# Patient Record
Sex: MALE | Race: BLACK OR AFRICAN AMERICAN | NOT HISPANIC OR LATINO | URBAN - METROPOLITAN AREA
[De-identification: names, ages, dates, MRNs, and addresses within clinical notes are randomized per-mention and may not be internally consistent; named-entity substitution may affect disease eponyms.]

---

## 2020-02-20 ENCOUNTER — EMERGENCY (EMERGENCY)
Facility: HOSPITAL | Age: 25
LOS: 1 days | Discharge: HOME | End: 2020-02-20
Attending: EMERGENCY MEDICINE | Admitting: EMERGENCY MEDICINE
Payer: MEDICAID

## 2020-02-20 VITALS
DIASTOLIC BLOOD PRESSURE: 69 MMHG | TEMPERATURE: 99 F | SYSTOLIC BLOOD PRESSURE: 105 MMHG | HEIGHT: 70 IN | WEIGHT: 136.91 LBS | OXYGEN SATURATION: 100 % | RESPIRATION RATE: 18 BRPM | HEART RATE: 89 BPM

## 2020-02-20 VITALS
SYSTOLIC BLOOD PRESSURE: 107 MMHG | TEMPERATURE: 98 F | RESPIRATION RATE: 18 BRPM | OXYGEN SATURATION: 99 % | HEART RATE: 71 BPM | DIASTOLIC BLOOD PRESSURE: 70 MMHG

## 2020-02-20 LAB
ALBUMIN SERPL ELPH-MCNC: 5.2 G/DL — HIGH (ref 3.3–5)
ALP SERPL-CCNC: 51 U/L — SIGNIFICANT CHANGE UP (ref 40–120)
ALT FLD-CCNC: 15 U/L — SIGNIFICANT CHANGE UP (ref 10–45)
ANION GAP SERPL CALC-SCNC: 16 MMOL/L — SIGNIFICANT CHANGE UP (ref 5–17)
APPEARANCE UR: CLEAR — SIGNIFICANT CHANGE UP
APTT BLD: 29.7 SEC — SIGNIFICANT CHANGE UP (ref 27.5–36.3)
AST SERPL-CCNC: 29 U/L — SIGNIFICANT CHANGE UP (ref 10–40)
BASOPHILS # BLD AUTO: 0 K/UL — SIGNIFICANT CHANGE UP (ref 0–0.2)
BASOPHILS NFR BLD AUTO: 0 % — SIGNIFICANT CHANGE UP (ref 0–2)
BILIRUB SERPL-MCNC: 0.4 MG/DL — SIGNIFICANT CHANGE UP (ref 0.2–1.2)
BILIRUB UR-MCNC: NEGATIVE — SIGNIFICANT CHANGE UP
BUN SERPL-MCNC: 12 MG/DL — SIGNIFICANT CHANGE UP (ref 7–23)
CALCIUM SERPL-MCNC: 9.8 MG/DL — SIGNIFICANT CHANGE UP (ref 8.4–10.5)
CHLORIDE SERPL-SCNC: 102 MMOL/L — SIGNIFICANT CHANGE UP (ref 96–108)
CO2 SERPL-SCNC: 24 MMOL/L — SIGNIFICANT CHANGE UP (ref 22–31)
COLOR SPEC: YELLOW — SIGNIFICANT CHANGE UP
CREAT SERPL-MCNC: 1.18 MG/DL — SIGNIFICANT CHANGE UP (ref 0.5–1.3)
DIFF PNL FLD: NEGATIVE — SIGNIFICANT CHANGE UP
EOSINOPHIL # BLD AUTO: 0 K/UL — SIGNIFICANT CHANGE UP (ref 0–0.5)
EOSINOPHIL NFR BLD AUTO: 0 % — SIGNIFICANT CHANGE UP (ref 0–6)
FLU A RESULT: SIGNIFICANT CHANGE UP
FLU A RESULT: SIGNIFICANT CHANGE UP
FLUAV AG NPH QL: SIGNIFICANT CHANGE UP
FLUBV AG NPH QL: DETECTED
GLUCOSE SERPL-MCNC: 109 MG/DL — HIGH (ref 70–99)
GLUCOSE UR QL: NEGATIVE — SIGNIFICANT CHANGE UP
HCT VFR BLD CALC: 43.8 % — SIGNIFICANT CHANGE UP (ref 39–50)
HGB BLD-MCNC: 14.6 G/DL — SIGNIFICANT CHANGE UP (ref 13–17)
INR BLD: 1.36 — HIGH (ref 0.88–1.16)
KETONES UR-MCNC: 15 MG/DL
LEUKOCYTE ESTERASE UR-ACNC: NEGATIVE — SIGNIFICANT CHANGE UP
LIDOCAIN IGE QN: 27 U/L — SIGNIFICANT CHANGE UP (ref 7–60)
LYMPHOCYTES # BLD AUTO: 0.21 K/UL — LOW (ref 1–3.3)
LYMPHOCYTES # BLD AUTO: 2.6 % — LOW (ref 13–44)
MCHC RBC-ENTMCNC: 29.7 PG — SIGNIFICANT CHANGE UP (ref 27–34)
MCHC RBC-ENTMCNC: 33.3 GM/DL — SIGNIFICANT CHANGE UP (ref 32–36)
MCV RBC AUTO: 89 FL — SIGNIFICANT CHANGE UP (ref 80–100)
MONOCYTES # BLD AUTO: 0.35 K/UL — SIGNIFICANT CHANGE UP (ref 0–0.9)
MONOCYTES NFR BLD AUTO: 4.3 % — SIGNIFICANT CHANGE UP (ref 2–14)
NEUTROPHILS # BLD AUTO: 7.25 K/UL — SIGNIFICANT CHANGE UP (ref 1.8–7.4)
NEUTROPHILS NFR BLD AUTO: 87 % — HIGH (ref 43–77)
NITRITE UR-MCNC: NEGATIVE — SIGNIFICANT CHANGE UP
PH UR: 7.5 — SIGNIFICANT CHANGE UP (ref 5–8)
PLATELET # BLD AUTO: 207 K/UL — SIGNIFICANT CHANGE UP (ref 150–400)
POTASSIUM SERPL-MCNC: 3.8 MMOL/L — SIGNIFICANT CHANGE UP (ref 3.5–5.3)
POTASSIUM SERPL-SCNC: 3.8 MMOL/L — SIGNIFICANT CHANGE UP (ref 3.5–5.3)
PROT SERPL-MCNC: 8.2 G/DL — SIGNIFICANT CHANGE UP (ref 6–8.3)
PROT UR-MCNC: NEGATIVE MG/DL — SIGNIFICANT CHANGE UP
PROTHROM AB SERPL-ACNC: 15.6 SEC — HIGH (ref 10–12.9)
RBC # BLD: 4.92 M/UL — SIGNIFICANT CHANGE UP (ref 4.2–5.8)
RBC # FLD: 12.9 % — SIGNIFICANT CHANGE UP (ref 10.3–14.5)
RSV RESULT: SIGNIFICANT CHANGE UP
RSV RNA RESP QL NAA+PROBE: SIGNIFICANT CHANGE UP
SODIUM SERPL-SCNC: 142 MMOL/L — SIGNIFICANT CHANGE UP (ref 135–145)
SP GR SPEC: 1.01 — SIGNIFICANT CHANGE UP (ref 1–1.03)
UROBILINOGEN FLD QL: 0.2 E.U./DL — SIGNIFICANT CHANGE UP
WBC # BLD: 8.25 K/UL — SIGNIFICANT CHANGE UP (ref 3.8–10.5)
WBC # FLD AUTO: 8.25 K/UL — SIGNIFICANT CHANGE UP (ref 3.8–10.5)

## 2020-02-20 PROCEDURE — 85610 PROTHROMBIN TIME: CPT

## 2020-02-20 PROCEDURE — 81003 URINALYSIS AUTO W/O SCOPE: CPT

## 2020-02-20 PROCEDURE — 85730 THROMBOPLASTIN TIME PARTIAL: CPT

## 2020-02-20 PROCEDURE — 85025 COMPLETE CBC W/AUTO DIFF WBC: CPT

## 2020-02-20 PROCEDURE — 80053 COMPREHEN METABOLIC PANEL: CPT

## 2020-02-20 PROCEDURE — 83690 ASSAY OF LIPASE: CPT

## 2020-02-20 PROCEDURE — 96375 TX/PRO/DX INJ NEW DRUG ADDON: CPT

## 2020-02-20 PROCEDURE — 36415 COLL VENOUS BLD VENIPUNCTURE: CPT

## 2020-02-20 PROCEDURE — 87631 RESP VIRUS 3-5 TARGETS: CPT

## 2020-02-20 PROCEDURE — 99284 EMERGENCY DEPT VISIT MOD MDM: CPT | Mod: 25

## 2020-02-20 PROCEDURE — 99284 EMERGENCY DEPT VISIT MOD MDM: CPT

## 2020-02-20 PROCEDURE — 96374 THER/PROPH/DIAG INJ IV PUSH: CPT

## 2020-02-20 RX ORDER — FAMOTIDINE 10 MG/ML
1 INJECTION INTRAVENOUS
Qty: 5 | Refills: 0
Start: 2020-02-20 | End: 2020-02-24

## 2020-02-20 RX ORDER — FAMOTIDINE 10 MG/ML
20 INJECTION INTRAVENOUS ONCE
Refills: 0 | Status: COMPLETED | OUTPATIENT
Start: 2020-02-20 | End: 2020-02-20

## 2020-02-20 RX ORDER — ONDANSETRON 8 MG/1
1 TABLET, FILM COATED ORAL
Qty: 15 | Refills: 0
Start: 2020-02-20 | End: 2020-02-24

## 2020-02-20 RX ORDER — SODIUM CHLORIDE 9 MG/ML
2000 INJECTION INTRAMUSCULAR; INTRAVENOUS; SUBCUTANEOUS ONCE
Refills: 0 | Status: COMPLETED | OUTPATIENT
Start: 2020-02-20 | End: 2020-02-20

## 2020-02-20 RX ORDER — PANTOPRAZOLE SODIUM 20 MG/1
40 TABLET, DELAYED RELEASE ORAL ONCE
Refills: 0 | Status: COMPLETED | OUTPATIENT
Start: 2020-02-20 | End: 2020-02-20

## 2020-02-20 RX ORDER — ACETAMINOPHEN 500 MG
1000 TABLET ORAL ONCE
Refills: 0 | Status: COMPLETED | OUTPATIENT
Start: 2020-02-20 | End: 2020-02-20

## 2020-02-20 RX ORDER — ONDANSETRON 8 MG/1
4 TABLET, FILM COATED ORAL ONCE
Refills: 0 | Status: COMPLETED | OUTPATIENT
Start: 2020-02-20 | End: 2020-02-20

## 2020-02-20 RX ADMIN — ONDANSETRON 4 MILLIGRAM(S): 8 TABLET, FILM COATED ORAL at 11:14

## 2020-02-20 RX ADMIN — FAMOTIDINE 20 MILLIGRAM(S): 10 INJECTION INTRAVENOUS at 11:14

## 2020-02-20 RX ADMIN — Medication 30 MILLILITER(S): at 11:14

## 2020-02-20 RX ADMIN — PANTOPRAZOLE SODIUM 40 MILLIGRAM(S): 20 TABLET, DELAYED RELEASE ORAL at 12:09

## 2020-02-20 RX ADMIN — Medication 75 MILLIGRAM(S): at 12:46

## 2020-02-20 RX ADMIN — SODIUM CHLORIDE 2000 MILLILITER(S): 9 INJECTION INTRAMUSCULAR; INTRAVENOUS; SUBCUTANEOUS at 11:15

## 2020-02-20 RX ADMIN — Medication 1000 MILLIGRAM(S): at 11:14

## 2020-02-20 NOTE — ED PROVIDER NOTE - EYES, MLM
Case discussed at rounds. Pt is being transferred to ICU secondary to decline in medical status. PT will continue to follow and see as appropriate.  Thank you, Mary Wolfe, PT Clear bilaterally, pupils equal, round and reactive to light.

## 2020-02-20 NOTE — ED PROVIDER NOTE - NSFOLLOWUPINSTRUCTIONS_ED_ALL_ED_FT
Influenza, Adult  Influenza, more commonly known as "the flu," is a viral infection that mainly affects the respiratory tract. The respiratory tract includes organs that help you breathe, such as the lungs, nose, and throat. The flu causes many symptoms similar to the common cold along with high fever and body aches.  The flu spreads easily from person to person (is contagious). Getting a flu shot (influenza vaccination) every year is the best way to prevent the flu.  What are the causes?  This condition is caused by the influenza virus. You can get the virus by:  Breathing in droplets that are in the air from an infected person's cough or sneeze.Touching something that has been exposed to the virus (has been contaminated) and then touching your mouth, nose, or eyes.What increases the risk?  The following factors may make you more likely to get the flu:  Not washing or sanitizing your hands often.Having close contact with many people during cold and flu season.Touching your mouth, eyes, or nose without first washing or sanitizing your hands.Not getting a yearly (annual) flu shot.You may have a higher risk for the flu, including serious problems such as a lung infection (pneumonia), if you:  Are older than 65.Are pregnant.Have a weakened disease-fighting system (immune system). You may have a weakened immune system if you:  Have HIV or AIDS.Are undergoing chemotherapy.Are taking medicines that reduce (suppress) the activity of your immune system.Have a long-term (chronic) illness, such as heart disease, kidney disease, diabetes, or lung disease.Have a liver disorder.Are severely overweight (morbidly obese).Have anemia. This is a condition that affects your red blood cells.Have asthma.What are the signs or symptoms?  Symptoms of this condition usually begin suddenly and last 4–14 days. They may include:  Fever and chills.Headaches, body aches, or muscle aches.Sore throat.Cough.Runny or stuffy (congested) nose.Chest discomfort.Poor appetite.Weakness or fatigue.Dizziness.Nausea or vomiting.How is this diagnosed?  This condition may be diagnosed based on:  Your symptoms and medical history.A physical exam. Swabbing your nose or throat and testing the fluid for the influenza virus.How is this treated?  If the flu is diagnosed early, you can be treated with medicine that can help reduce how severe the illness is and how long it lasts (antiviral medicine). This may be given by mouth (orally) or through an IV.  Taking care of yourself at home can help relieve symptoms. Your health care provider may recommend:  Taking over-the-counter medicines.Drinking plenty of fluids.In many cases, the flu goes away on its own. If you have severe symptoms or complications, you may be treated in a hospital.  Follow these instructions at home:  Activity     Rest as needed and get plenty of sleep.Stay home from work or school as told by your health care provider. Unless you are visiting your health care provider, avoid leaving home until your fever has been gone for 24 hours without taking medicine.Eating and drinking     Take an oral rehydration solution (ORS). This is a drink that is sold at pharmacies and retail stores.Drink enough fluid to keep your urine pale yellow.Drink clear fluids in small amounts as you are able. Clear fluids include water, ice chips, diluted fruit juice, and low-calorie sports drinks.Eat bland, easy-to-digest foods in small amounts as you are able. These foods include bananas, applesauce, rice, lean meats, toast, and crackers.Avoid drinking fluids that contain a lot of sugar or caffeine, such as energy drinks, regular sports drinks, and soda.Avoid alcohol.Avoid spicy or fatty foods.General instructions               Take over-the-counter and prescription medicines only as told by your health care provider.Use a cool mist humidifier to add humidity to the air in your home. This can make it easier to breathe.Cover your mouth and nose when you cough or sneeze.Wash your hands with soap and water often, especially after you cough or sneeze. If soap and water are not available, use alcohol-based hand .Keep all follow-up visits as told by your health care provider. This is important.How is this prevented?     Get an annual flu shot. You may get the flu shot in late summer, fall, or winter. Ask your health care provider when you should get your flu shot.Avoid contact with people who are sick during cold and flu season. This is generally fall and winter.Contact a health care provider if:  You develop new symptoms.You have:  Chest pain.Diarrhea.A fever.Your cough gets worse.You produce more mucus.You feel nauseous or you vomit.Get help right away if:  You develop shortness of breath or difficulty breathing.Your skin or nails turn a bluish color.You have severe pain or stiffness in your neck.You develop a sudden headache or sudden pain in your face or ear.You cannot eat or drink without vomiting.Summary  Influenza, more commonly known as "the flu," is a viral infection that primarily affects your respiratory tract.Symptoms of the flu usually begin suddenly and last 4–14 days.Getting an annual flu shot is the best way to prevent getting the flu.Stay home from work or school as told by your health care provider. Unless you are visiting your health care provider, avoid leaving home until your fever has been gone for 24 hours without taking medicine.Keep all follow-up visits as told by your health care provider. This is important.This information is not intended to replace advice given to you by your health care provider. Make sure you discuss any questions you have with your health care provider.    Document Released: 12/15/2001 Document Revised: 06/05/2019 Document Reviewed: 06/05/2019  [employer] Patient Education © 2020 [employer]    Gastritis    WHAT YOU NEED TO KNOW:    Gastritis is inflammation or irritation of the lining of your stomach. Abdominal Organs         DISCHARGE INSTRUCTIONS:    Call 911 for any of the following:     You develop chest pain or shortness of breath.        Return to the emergency department if:     You vomit blood.      You have black or bloody bowel movements.      You have severe stomach or back pain.    Contact your healthcare provider if:     You have a fever.      You have new or worsening symptoms, even after treatment.      You have questions or concerns about your condition or care.    Medicines:     Medicines may be given to help treat a bacterial infection or decrease stomach acid.       Take your medicine as directed. Contact your healthcare provider if you think your medicine is not helping or if you have side effects. Tell him or her if you are allergic to any medicine. Keep a list of the medicines, vitamins, and herbs you take. Include the amounts, and when and why you take them. Bring the list or the pill bottles to follow-up visits. Carry your medicine list with you in case of an emergency.    Manage or prevent gastritis:     Do not smoke. Nicotine and other chemicals in cigarettes and cigars can make your symptoms worse and cause lung damage. Ask your healthcare provider for information if you currently smoke and need help to quit. E-cigarettes or smokeless tobacco still contain nicotine. Talk to your healthcare provider before you use these products.       Do not drink alcohol. Alcohol can prevent healing and make your gastritis worse. Talk to your healthcare provider if you need help to stop drinking.      Do not take NSAIDs or aspirin unless directed. These and similar medicines can cause irritation. If your healthcare provider says it is okay to take NSAIDs, take them with food.       Do not eat foods that cause irritation. Foods such as oranges and salsa can cause burning or pain. Eat a variety of healthy foods. Examples include fruits (not citrus), vegetables, low-fat dairy products, beans, whole-grain breads, and lean meats and fish. Try to eat small meals, and drink water with your meals. Do not eat for at least 3 hours before you go to bed.      Find ways to relax and decrease stress. Stress can increase stomach acid and make gastritis worse. Activities such as yoga, meditation, or listening to music can help you relax. Spend time with friends, or do things you enjoy.    Follow up with your healthcare provider as directed: You may need ongoing tests or treatment, or referral to a gastroenterologist. Write down your questions so you remember to ask them during your visits.       © Copyright [employer] 2020       back to top                      © Copyright [employer] 2020

## 2020-02-20 NOTE — ED PROVIDER NOTE - ATTENDING CONTRIBUTION TO CARE
24M no sig pmh p/w n/v, diarrhea, subjective fever x2d. Body aches, unwell. Went out drinking tequila last night, felt worse today. Endorsed vomiting blood today w/ aching. All sx resolved upon arrival. Nontoxic, no focal ttp. Concern gastritis, gastroenteritis, alcohol abuse, doubt esophageal tear or perforation.

## 2020-02-20 NOTE — ED PROVIDER NOTE - PATIENT PORTAL LINK FT
You can access the FollowMyHealth Patient Portal offered by [facility] by registering at the following website: [email or web address]. By joining [employer]’s FollowMyHealth portal, you will also be able to view your health information using other applications (apps) compatible with our system.

## 2020-02-20 NOTE — ED PROVIDER NOTE - CARE PLAN
Principal Discharge DX:	Alcoholic gastritis Principal Discharge DX:	Alcoholic gastritis  Secondary Diagnosis:	Influenza B

## 2020-02-20 NOTE — ED PROVIDER NOTE - CARE PROVIDER_API CALL
Humberto Torres)  [facility]  [address], [address]  [place] [address]  Phone: [phone]  Fax: [phone]  Follow Up Time:

## 2020-02-20 NOTE — ED PROVIDER NOTE - CLINICAL SUMMARY MEDICAL DECISION MAKING FREE TEXT BOX
25 y/o male well-appearing without acute abdomen or chest pain here with N/V/D. x2 episodes of bright red blood earlier this morning. Pt with + hx of GERD. 25 y/o male well-appearing without acute abdomen or chest pain here with N/V/D. x2 episodes of bright red blood earlier this morning. Pt with + hx of GERD. Consider viral, flu, gastroenteritis. Do not suspect colitis, cholecystitis, ruptured varices, aneurysm, peptic ulcer. Labs wnml. HDS. Advised to dc use of alcohol. Follow-up with GI. Flu +, will given tamiflu. Discussed hand hygiene and return precautions.

## 2020-02-20 NOTE — ED ADULT NURSE NOTE - OBJECTIVE STATEMENT
Pt came to ER with c/o sudden onset of NVD yesterday. Pt states "I went out drinking yesterday and then I started vomiting and it looked black".

## 2020-02-20 NOTE — ED PROVIDER NOTE - OBJECTIVE STATEMENT
23 y/o M who presents to the ED for nausea, vomiting, diarrhea and subjective fever for 2 d. Pt states that he was vomiting blood for 2d that contained bright red blood. He reports stiffness of hands, overall weakness, subjective fever, but denies sick contacts. Pt states he was feeling slightly better yesterday, but went out last night and ate burger violetta, when he started to feel burning sensation. He also drank tequila last night which he reports increased burning sensation. Since then pt has been vomiting more, with what he states are "black clumps", he has had 3 episodes of diarrhea, and abdominal pain. He is currently not in pain here in ED. Denies hx of ulcers or gastritis. 23 y/o M who presents to the ED for nausea, vomiting, diarrhea and subjective fever for 2 d. He reports stiffness of hands, overall weakness, subjective fever, but denies sick contacts. Pt states he was feeling slightly better yesterday, but went out last night and ate burger violetta, when he started to feel burning sensation followed by noticing bright red blood when he vomited. He also drank tequila last night which he reports increased burning sensation. Since then pt has been vomiting more, with what he states are "black clumps", however was consuming coca cola, he has had 3 episodes of diarrhea, and abdominal pain. He is currently not in pain here in ED. Denies hx of ulcers or gastritis.

## 2020-02-20 NOTE — ED PROVIDER NOTE - CPE EDP ENMT NORM
Most recent office notes have been faxed to [facility] at [phone]. As per caller these notes have been requested multiple times since October with no response.   normal...

## 2020-02-20 NOTE — ED ADULT TRIAGE NOTE - CHIEF COMPLAINT QUOTE
pt c/o nausea, vomiting and diarrhea since last night, pt states " I started vomiting and since 10 pm yesterday I see dark blood when I vomit"

## 2020-02-26 DIAGNOSIS — J45.909 UNSPECIFIED ASTHMA, UNCOMPLICATED: ICD-10-CM

## 2020-02-26 DIAGNOSIS — J10.1 INFLUENZA DUE TO OTHER IDENTIFIED INFLUENZA VIRUS WITH OTHER RESPIRATORY MANIFESTATIONS: ICD-10-CM

## 2020-02-26 DIAGNOSIS — R11.2 NAUSEA WITH VOMITING, UNSPECIFIED: ICD-10-CM

## 2020-02-26 DIAGNOSIS — Z91.018 ALLERGY TO OTHER FOODS: ICD-10-CM

## 2020-02-26 DIAGNOSIS — K29.00 ACUTE GASTRITIS WITHOUT BLEEDING: ICD-10-CM

## 2022-01-25 NOTE — ED ADULT NURSE NOTE - NSFALLRSKHARMRISK_ED_ALL_ED
Please call the [facility] at [phone], select OPTION #2,  if any of your medications change.  This means: if a med is discontinued, a new med is started, or a dose is changed.  These meds may interact with your warfarin and we may need to adjust your dose.  This is especially important if you start any type of ANTIBIOTIC.    Also, please call if you have any admissions to the hospital, visits to an emergency room, procedures planned, or if any other medical provider has instructed you to hold your warfarin.     no

## 2025-07-23 NOTE — ED ADULT NURSE NOTE - CHPI ED NUR QUALITY2
Prechart    Diagnosis: Acquired hypothyroidism, Obesity   Visit type: Follow-up Visit     Labs to complete prior to visit: FLP, A1C  Patient notified to complete labs prior to appt? Yes  A1C to be completed in the clinic: No    Last 3 Weights & 3 BMIs:  Wt Readings from Last 3 Encounters:   07/15/25 81.1 kg (178 lb 12.8 oz)   06/25/25 81.8 kg (180 lb 6.4 oz)   03/18/25 78.2 kg (172 lb 5.7 oz)     BMI Readings from Last 3 Encounters:   07/15/25 29.48 kg/m²   06/25/25 30.02 kg/m²   03/18/25 28.68 kg/m²     Labs:    Oral Glucose Tolerance Test or A1C:   No recent results    Fasting Lipid Panel:  Recent Labs   Lab 07/10/24  0844   Fasting Status 12   Cholesterol 168   Triglycerides 68   HDL 54   CALCLDL 100   Non-HDL Cholesterol 114   Cholesterol/ HDL Ratio 3.1     CMP/BMP:  Recent Labs   Lab 09/13/24  1250 07/10/24  0844   Fasting Status  --  12   Sodium 139 138   Potassium 3.7 4.5   Chloride 108 108   Carbon Dioxide 28 26   Anion Gap 7 9   Glucose 90 86   BUN 22* 21*   Creatinine 0.78 0.82   Glomerular Filtration Rate >90 89   Calcium 8.8 8.9   Bilirubin, Total 0.5 0.4   GOT/AST 12 13   GPT 22 18   Alkaline Phosphatase 75 74   Albumin 3.7 4.0   Protein, Total 7.8 7.7   Globulin 4.1* 3.7   A/G Ratio 0.9* 1.1     TSH/T4/T3:  Recent Labs   Lab 12/26/24  1050 09/13/24  1250 07/10/24  0844   TSH 1.006 0.755 0.873     Recent Labs   Lab 09/13/24  1250 08/07/23  0755   T4, Free 1.1 1.0     Thyroid Stimulating Immunoglobulin:  No results    Thyroglobulin with reflex:   No results          cramping